# Patient Record
Sex: FEMALE | Race: WHITE | URBAN - METROPOLITAN AREA
[De-identification: names, ages, dates, MRNs, and addresses within clinical notes are randomized per-mention and may not be internally consistent; named-entity substitution may affect disease eponyms.]

---

## 2022-08-07 ENCOUNTER — NURSE TRIAGE (OUTPATIENT)
Dept: NURSING | Facility: CLINIC | Age: 22
End: 2022-08-07

## 2022-08-07 NOTE — TELEPHONE ENCOUNTER
"2-3 days last weekend eyes sensitive to light and painful. Switched to glasses and used OTC eye drops. Tuesday/wednesday they are feeling better so wore contacts again. Yesterday evening, sensitivity, burning and watering returned. Present this morning but much better this afternoon.   Per protocol advised being seen within 24 hours if possible since this is an on going issue. Advised to call back if symptoms worsen.   Patient voiced understanding and will follow disposition.   Paula Herron RN  [facility] Nurse Advisor        Reason for Disposition    Eye pain present > 24 hours    Additional Information    Negative: Followed an eye injury    Negative: Eye pain from chemical in the eye    Negative: Eye pain from foreign body in eye    Negative: [1] Tender, red lump or pimple AND [2] located along the eyelid margin    Negative: Has sinus pain or pressure    Negative: SEVERE eye pain    Negative: Complete loss of vision in one or both eyes    Negative: [1] Eyelids are very swollen (shut or almost) AND [2] fever    Negative: [1] Eyelid (outer) is very red AND [2] fever    Negative: [1] Foreign body sensation (\"feels like something is in there\") AND [2] irrigation didn't help    Negative: Vomiting    Negative: Ulcer or sore seen on the cornea (clear center part of the eye)    Negative: [1] Recent eye surgery AND [2] increasing eye pain    Negative: [1] Blurred vision AND [2] new or worsening    Negative: Patient sounds very sick or weak to the triager    Negative: MODERATE eye pain or discomfort (e.g., interferes with normal activities or awakens from sleep; more than mild)    Negative: [1] Eyelids are very swollen (shut or almost) AND [2] no fever    Negative: [1] Painful rash near eye AND [2] multiple small blisters grouped together    Negative: Pain followed bright light exposure from welding    Negative: Looking at light causes MODERATE to SEVERE eye pain (i.e., photophobia)    Negative: Yellow or green pus " occurs    Protocols used: EYE PAIN AND OTHER SYMPTOMS-A-AH

## 2024-06-24 ENCOUNTER — LAB REQUISITION (OUTPATIENT)
Dept: LAB | Facility: CLINIC | Age: 24
End: 2024-06-24
Payer: COMMERCIAL

## 2024-06-24 DIAGNOSIS — Z12.4 ENCOUNTER FOR SCREENING FOR MALIGNANT NEOPLASM OF CERVIX: ICD-10-CM

## 2024-06-24 PROCEDURE — G0145 SCR C/V CYTO,THINLAYER,RESCR: HCPCS | Mod: ORL | Performed by: STUDENT IN AN ORGANIZED HEALTH CARE EDUCATION/TRAINING PROGRAM

## 2024-06-27 LAB
BKR LAB AP GYN ADEQUACY: NORMAL
BKR LAB AP GYN INTERPRETATION: NORMAL
BKR LAB AP LMP: NORMAL
BKR LAB AP PREVIOUS ABNORMAL: NORMAL
PATH REPORT.COMMENTS IMP SPEC: NORMAL
PATH REPORT.COMMENTS IMP SPEC: NORMAL
PATH REPORT.RELEVANT HX SPEC: NORMAL